# Patient Record
Sex: FEMALE
[De-identification: names, ages, dates, MRNs, and addresses within clinical notes are randomized per-mention and may not be internally consistent; named-entity substitution may affect disease eponyms.]

---

## 2019-04-30 ENCOUNTER — APPOINTMENT (OUTPATIENT)
Dept: HEART AND VASCULAR | Facility: CLINIC | Age: 39
End: 2019-04-30
Payer: COMMERCIAL

## 2019-04-30 ENCOUNTER — NON-APPOINTMENT (OUTPATIENT)
Age: 39
End: 2019-04-30

## 2019-04-30 VITALS
WEIGHT: 200 LBS | RESPIRATION RATE: 16 BRPM | SYSTOLIC BLOOD PRESSURE: 120 MMHG | HEIGHT: 70.5 IN | DIASTOLIC BLOOD PRESSURE: 80 MMHG | BODY MASS INDEX: 28.31 KG/M2 | HEART RATE: 68 BPM

## 2019-04-30 DIAGNOSIS — Z82.49 FAMILY HISTORY OF ISCHEMIC HEART DISEASE AND OTHER DISEASES OF THE CIRCULATORY SYSTEM: ICD-10-CM

## 2019-04-30 DIAGNOSIS — Z78.9 OTHER SPECIFIED HEALTH STATUS: ICD-10-CM

## 2019-04-30 DIAGNOSIS — Z82.5 FAMILY HISTORY OF ASTHMA AND OTHER CHRONIC LOWER RESPIRATORY DISEASES: ICD-10-CM

## 2019-04-30 PROCEDURE — 99204 OFFICE O/P NEW MOD 45 MIN: CPT

## 2019-04-30 PROCEDURE — 93000 ELECTROCARDIOGRAM COMPLETE: CPT

## 2019-04-30 RX ORDER — PNV NO.95/FERROUS FUM/FOLIC AC 28MG-0.8MG
TABLET ORAL
Refills: 0 | Status: ACTIVE | COMMUNITY

## 2019-04-30 RX ORDER — LYSINE 600 MG
TABLET ORAL
Refills: 0 | Status: ACTIVE | COMMUNITY

## 2019-04-30 NOTE — DISCUSSION/SUMMARY
[FreeTextEntry1] : 38 year old F with strong family history of premature CAD with occasional dyspnea on exertion.\par - Stress Echocardiogram\par - Fasting lipid profile collected today\par - Followup after testing completed.

## 2019-04-30 NOTE — REASON FOR VISIT
[Initial Evaluation] : an initial evaluation of [FreeTextEntry1] : 38 year old F with strong family history of CAD - brother with MI at age 29, brother with coronary artery disease diagnosed at age 39 here for cardiac evaluation. She states she gets occasional dyspnea on exertion. No chest pain, orthopnea, PND, edema. \par \par EKG today NSR at 75 bpm without STT abnormalities. \par \par Labs 05/12/2018: Vit D 40; Vit B12 824; CBC WNL; TSH WNL; Total chol 157; HDL 53; LDL calc 90; TG 59; CMP WNL

## 2019-05-01 LAB
ALBUMIN SERPL ELPH-MCNC: 4.4 G/DL
ALP BLD-CCNC: 38 U/L
ALT SERPL-CCNC: 13 U/L
ANION GAP SERPL CALC-SCNC: 10 MMOL/L
AST SERPL-CCNC: 18 U/L
BASOPHILS # BLD AUTO: 0.05 K/UL
BASOPHILS NFR BLD AUTO: 0.7 %
BILIRUB SERPL-MCNC: 0.5 MG/DL
BUN SERPL-MCNC: 11 MG/DL
CALCIUM SERPL-MCNC: 9.3 MG/DL
CHLORIDE SERPL-SCNC: 102 MMOL/L
CHOLEST SERPL-MCNC: 165 MG/DL
CHOLEST/HDLC SERPL: 2.4 RATIO
CO2 SERPL-SCNC: 27 MMOL/L
CREAT SERPL-MCNC: 0.59 MG/DL
EOSINOPHIL # BLD AUTO: 0.01 K/UL
EOSINOPHIL NFR BLD AUTO: 0.1 %
GLUCOSE SERPL-MCNC: 96 MG/DL
HCT VFR BLD CALC: 41.7 %
HDLC SERPL-MCNC: 69 MG/DL
HGB BLD-MCNC: 13.4 G/DL
IMM GRANULOCYTES NFR BLD AUTO: 0.4 %
LDLC SERPL CALC-MCNC: 88 MG/DL
LDLC SERPL DIRECT ASSAY-MCNC: 94 MG/DL
LYMPHOCYTES # BLD AUTO: 1.12 K/UL
LYMPHOCYTES NFR BLD AUTO: 14.9 %
MAN DIFF?: NORMAL
MCHC RBC-ENTMCNC: 29.2 PG
MCHC RBC-ENTMCNC: 32.1 GM/DL
MCV RBC AUTO: 90.8 FL
MONOCYTES # BLD AUTO: 0.3 K/UL
MONOCYTES NFR BLD AUTO: 4 %
NEUTROPHILS # BLD AUTO: 5.99 K/UL
NEUTROPHILS NFR BLD AUTO: 79.9 %
PLATELET # BLD AUTO: 291 K/UL
POTASSIUM SERPL-SCNC: 3.8 MMOL/L
PROT SERPL-MCNC: 7.3 G/DL
RBC # BLD: 4.59 M/UL
RBC # FLD: 12.6 %
SODIUM SERPL-SCNC: 139 MMOL/L
TRIGL SERPL-MCNC: 42 MG/DL
WBC # FLD AUTO: 7.5 K/UL

## 2019-05-02 ENCOUNTER — RESULT REVIEW (OUTPATIENT)
Age: 39
End: 2019-05-02

## 2019-05-03 ENCOUNTER — RESULT REVIEW (OUTPATIENT)
Age: 39
End: 2019-05-03

## 2019-05-21 ENCOUNTER — APPOINTMENT (OUTPATIENT)
Dept: HEART AND VASCULAR | Facility: CLINIC | Age: 39
End: 2019-05-21
Payer: COMMERCIAL

## 2019-05-21 VITALS
WEIGHT: 200 LBS | DIASTOLIC BLOOD PRESSURE: 70 MMHG | BODY MASS INDEX: 28.31 KG/M2 | HEART RATE: 76 BPM | SYSTOLIC BLOOD PRESSURE: 108 MMHG | HEIGHT: 70.5 IN

## 2019-05-21 PROCEDURE — 93351 STRESS TTE COMPLETE: CPT

## 2019-05-21 PROCEDURE — 93325 DOPPLER ECHO COLOR FLOW MAPG: CPT

## 2019-05-21 PROCEDURE — 93320 DOPPLER ECHO COMPLETE: CPT

## 2019-05-22 ENCOUNTER — RESULT REVIEW (OUTPATIENT)
Age: 39
End: 2019-05-22

## 2019-06-04 ENCOUNTER — APPOINTMENT (OUTPATIENT)
Dept: HEART AND VASCULAR | Facility: CLINIC | Age: 39
End: 2019-06-04
Payer: COMMERCIAL

## 2019-06-04 VITALS
HEART RATE: 80 BPM | RESPIRATION RATE: 16 BRPM | DIASTOLIC BLOOD PRESSURE: 76 MMHG | SYSTOLIC BLOOD PRESSURE: 110 MMHG | HEIGHT: 70.5 IN | BODY MASS INDEX: 28.17 KG/M2 | WEIGHT: 199 LBS

## 2019-06-04 PROCEDURE — 99214 OFFICE O/P EST MOD 30 MIN: CPT

## 2019-06-04 NOTE — PHYSICAL EXAM
[Normal Appearance] : normal appearance [General Appearance - Well Developed] : well developed [General Appearance - Well Nourished] : well nourished [Well Groomed] : well groomed [No Deformities] : no deformities [General Appearance - In No Acute Distress] : no acute distress [No Oral Pallor] : no oral pallor [Normal Oral Mucosa] : normal oral mucosa [No Oral Cyanosis] : no oral cyanosis [Respiration, Rhythm And Depth] : normal respiratory rhythm and effort [Auscultation Breath Sounds / Voice Sounds] : lungs were clear to auscultation bilaterally [Heart Rate And Rhythm] : heart rate and rhythm were normal [Exaggerated Use Of Accessory Muscles For Inspiration] : no accessory muscle use [Murmurs] : no murmurs present [Heart Sounds] : normal S1 and S2 [Abdomen Soft] : soft [Abdomen Tenderness] : non-tender [Abdomen Mass (___ Cm)] : no abdominal mass palpated [Nail Clubbing] : no clubbing of the fingernails [] : no ischemic changes [Oriented To Time, Place, And Person] : oriented to person, place, and time [Cyanosis, Localized] : no localized cyanosis [Petechial Hemorrhages (___cm)] : no petechial hemorrhages [No Anxiety] : not feeling anxious [Mood] : the mood was normal [Affect] : the affect was normal

## 2019-06-04 NOTE — DISCUSSION/SUMMARY
[FreeTextEntry1] : 38 year old F with strong family history of premature CAD \par - Normal stress test and good lab results. \par - Followup in 1 year.

## 2019-06-04 NOTE — REASON FOR VISIT
[Initial Evaluation] : an initial evaluation of [FreeTextEntry1] : 38 year old F with strong family history of CAD - brother with MI at age 29, brother with coronary artery disease diagnosed at age 39 here for followup after stress test. She denies chest pain, dyspnea, orthopnea, PND, edema. \par \par May 1 2019: LDL 88; HDL 69; Total chol 165; TG 42; Direct LDL 94; CMP WNL; CBC WNL\par \par 05/21/2019: Clifford protocol 12:15 min; reached 96% of MPHR; No STT changes. No ischemia. \par Resting echo: Mild MR, Mild TR, LVEF 60%. \par \par EKG 4/30/2019 NSR at 75 bpm without STT abnormalities. \par \par Labs 05/12/2018: Vit D 40; Vit B12 824; CBC WNL; TSH WNL; Total chol 157; HDL 53; LDL calc 90; TG 59; CMP WNL

## 2020-10-12 ENCOUNTER — APPOINTMENT (OUTPATIENT)
Dept: HEART AND VASCULAR | Facility: CLINIC | Age: 40
End: 2020-10-12
Payer: COMMERCIAL

## 2020-10-12 DIAGNOSIS — Z00.00 ENCOUNTER FOR GENERAL ADULT MEDICAL EXAMINATION W/OUT ABNORMAL FINDINGS: ICD-10-CM

## 2020-10-12 DIAGNOSIS — R06.00 DYSPNEA, UNSPECIFIED: ICD-10-CM

## 2020-10-12 PROCEDURE — 36415 COLL VENOUS BLD VENIPUNCTURE: CPT

## 2020-10-13 LAB
BASOPHILS # BLD AUTO: 0.05 K/UL
BASOPHILS NFR BLD AUTO: 1.2 %
EOSINOPHIL # BLD AUTO: 0.1 K/UL
EOSINOPHIL NFR BLD AUTO: 2.3 %
HCT VFR BLD CALC: 43.3 %
HGB BLD-MCNC: 13.9 G/DL
IMM GRANULOCYTES NFR BLD AUTO: 0.2 %
LYMPHOCYTES # BLD AUTO: 1.46 K/UL
LYMPHOCYTES NFR BLD AUTO: 33.7 %
MAN DIFF?: NORMAL
MCHC RBC-ENTMCNC: 29.6 PG
MCHC RBC-ENTMCNC: 32.1 GM/DL
MCV RBC AUTO: 92.3 FL
MONOCYTES # BLD AUTO: 0.28 K/UL
MONOCYTES NFR BLD AUTO: 6.5 %
NEUTROPHILS # BLD AUTO: 2.43 K/UL
NEUTROPHILS NFR BLD AUTO: 56.1 %
PLATELET # BLD AUTO: 277 K/UL
RBC # BLD: 4.69 M/UL
RBC # FLD: 13.4 %
WBC # FLD AUTO: 4.33 K/UL

## 2020-10-14 LAB
ALBUMIN SERPL ELPH-MCNC: 4.5 G/DL
ALP BLD-CCNC: 50 U/L
ALT SERPL-CCNC: 13 U/L
ANION GAP SERPL CALC-SCNC: 12 MMOL/L
AST SERPL-CCNC: 16 U/L
BILIRUB SERPL-MCNC: 0.2 MG/DL
BUN SERPL-MCNC: 11 MG/DL
CALCIUM SERPL-MCNC: 8.9 MG/DL
CHLORIDE SERPL-SCNC: 105 MMOL/L
CHOLEST SERPL-MCNC: 182 MG/DL
CHOLEST/HDLC SERPL: 2.6 RATIO
CO2 SERPL-SCNC: 23 MMOL/L
CREAT SERPL-MCNC: 0.64 MG/DL
GLUCOSE SERPL-MCNC: 89 MG/DL
HDLC SERPL-MCNC: 70 MG/DL
LDLC SERPL CALC-MCNC: 96 MG/DL
POTASSIUM SERPL-SCNC: 4.9 MMOL/L
PROT SERPL-MCNC: 6.6 G/DL
SODIUM SERPL-SCNC: 140 MMOL/L
TRIGL SERPL-MCNC: 78 MG/DL